# Patient Record
Sex: FEMALE | Race: WHITE
[De-identification: names, ages, dates, MRNs, and addresses within clinical notes are randomized per-mention and may not be internally consistent; named-entity substitution may affect disease eponyms.]

---

## 2017-06-22 ENCOUNTER — HOSPITAL ENCOUNTER (OUTPATIENT)
Dept: HOSPITAL 80 - BRMIMAGING | Age: 52
End: 2017-06-22
Attending: FAMILY MEDICINE
Payer: COMMERCIAL

## 2017-06-22 DIAGNOSIS — R05: Primary | ICD-10-CM

## 2018-01-26 ENCOUNTER — HOSPITAL ENCOUNTER (INPATIENT)
Dept: HOSPITAL 80 - F3N | Age: 53
LOS: 2 days | Discharge: HOME | DRG: 581 | End: 2018-01-28
Attending: SURGERY | Admitting: SURGERY
Payer: COMMERCIAL

## 2018-01-26 DIAGNOSIS — D18.02: ICD-10-CM

## 2018-01-26 DIAGNOSIS — N64.59: Primary | ICD-10-CM

## 2018-01-26 DIAGNOSIS — E03.9: ICD-10-CM

## 2018-01-26 DIAGNOSIS — Z80.3: ICD-10-CM

## 2018-01-26 DIAGNOSIS — Z85.3: ICD-10-CM

## 2018-01-26 PROCEDURE — 0HHV0NZ INSERTION OF TISSUE EXPANDER INTO BILATERAL BREAST, OPEN APPROACH: ICD-10-PCS | Performed by: SURGERY

## 2018-01-26 PROCEDURE — G0378 HOSPITAL OBSERVATION PER HR: HCPCS

## 2018-01-26 PROCEDURE — 07B60ZX EXCISION OF LEFT AXILLARY LYMPHATIC, OPEN APPROACH, DIAGNOSTIC: ICD-10-PCS | Performed by: SURGERY

## 2018-01-26 PROCEDURE — A9520 TC99 TILMANOCEPT DIAG 0.5MCI: HCPCS

## 2018-01-26 PROCEDURE — 0HBV0ZX EXCISION OF BILATERAL BREAST, OPEN APPROACH, DIAGNOSTIC: ICD-10-PCS | Performed by: SURGERY

## 2018-01-26 RX ADMIN — KETOROLAC TROMETHAMINE SCH MG: 15 INJECTION, SOLUTION INTRAMUSCULAR; INTRAVENOUS at 18:34

## 2018-01-26 RX ADMIN — OXYCODONE HYDROCHLORIDE SCH MG: 15 TABLET, FILM COATED, EXTENDED RELEASE ORAL at 21:22

## 2018-01-26 RX ADMIN — CLINDAMYCIN PHOSPHATE SCH MLS: 12 INJECTION, SOLUTION INTRAVENOUS at 21:16

## 2018-01-26 RX ADMIN — KETOROLAC TROMETHAMINE SCH MG: 15 INJECTION, SOLUTION INTRAMUSCULAR; INTRAVENOUS at 23:15

## 2018-01-26 RX ADMIN — Medication PRN MCG: at 16:55

## 2018-01-26 RX ADMIN — ONDANSETRON PRN MG: 2 SOLUTION INTRAMUSCULAR; INTRAVENOUS at 21:27

## 2018-01-26 RX ADMIN — Medication PRN MCG: at 17:22

## 2018-01-26 NOTE — GOP
[f rep st]



                                                                OPERATIVE REPORT





DATE OF OPERATION:  01/26/2018



SURGEON:  PIPPA Doyle Jr., MD



ASSISTANT:  Chao Restrepo CST, by surgeon request (skilled surgical assistant was necessary due t
o the technical complexity of the case and desire to minimize patient anesthesia time).



ANESTHESIA:  The patient had general inhalational anesthetic.



ANESTHESIOLOGIST:  Dr. Daniel Bergeron.



PREOPERATIVE DIAGNOSIS:  Inverted left nipple deformity with history of right breast cancer.



POSTOPERATIVE DIAGNOSIS:  Inverted left nipple deformity with history of right breast cancer.



PROCEDURE PERFORMED:  Immediate bilateral mastectomy reconstruction utilizing tissue expanders and hu
man dermal allograft.



FINDINGS:  





ESTIMATED BLOOD LOSS:  During reconstruction was 10 cc.



DESCRIPTION OF PROCEDURE:  After the risks and benefits of procedure were explained to the patient, h
ighlighting bleeding, infection, implant malposition, pain, numbness, damage to muscles or underlying
 structures, need for premature tissue expander removal, and need for additional procedures, formal o
perative consent was obtained.  She was taken to the operating room by Dr. Avila, where an uncomplicate
d bilateral mastectomy was performed.  



The reconstruction began by irrigating both pockets copiously with normal saline.  Meticulous hemosta
sis was assured.  The pockets were rinsed with double antibiotic saline.  Submuscular pocket was crea
jeff for the tissue expander through the inferior medial origin of the pectoralis major muscle.  A sub
muscular pocket was completely dissected beneath the serratus anterior and pectoralis major muscle.  
An Aeroform medium 600 cc tissue expander was soaked in triple antibiotic saline, tested with its shirley
l remote control, and then placed into the correct position on the chest wall bilaterally.  It was ovalles
tured down to the chest wall using 2-0 PDS suture.  



Medium contour AlloDerm acellular dermis graft sheets were triple rinsed in normal saline and soaked 
in triple antibiotic saline.  They were then used to reconstruct the inferior pole of both breasts.  
An additional 10 cc of air was placed into the initial 190 cc tissue expander, to assure correct franco
ng and functioning of the filled device prior to closing the mastectomy flaps.  The mastectomy flaps 
were warm and pink with good capillary refill.  Small dog ears were excised medially to assure smooth
 contour.  15-round BLAYNE drains were placed.  The pockets were rinsed a final time with double antibiot
ic saline.  15 cc of 0.25% plain Marcaine was instilled in the each pocket.  The mastectomy flaps wer
e then closed in 2 layers.  Bacitracin, Xeroform, 4 x 4's, and compressive bra were placed in the ope
rating room.  She was extubated in the OR, taken to recovery room, awake and in stable condition.



TISSUE EXPANDERS:  Aeroform 600 cc medium devices bilaterally, filled to 210 cc.



DRAINS:  Two BLAYNE drains were placed.



COMPLICATIONS:  No complications.



INDICATIONS FOR OPERATION:  The patient is a 52-year-old, white female, who underwent a previous righ
t lumpectomy for breast cancer and presented with a significant inverted left nipple deformity.  She 
elected to undergo a completion right-sided mastectomy with prophylactic left breast mastectomy, and 
was referred by Dr. Maykel Avila for consideration for reconstruction.  She was noted to have a severe
 asymmetry between the 2 breasts prior to surgery from her previous procedure, but was deemed a good 
candidate to have bilateral skin-sparing mastectomies with tissue expander placement, and was taken t
o the operating room for that purpose.





Job #:  [id number]

## 2018-01-26 NOTE — POSTOPPROG
Post Op Note


Date of Operation: 01/26/18


Surgeon: Maykel Avila


Anesthesiologist: Daniel Bergeron


Anesthesia: GET(General Endotracheal)


Pre-op Diagnosis: Personal history of breast cancer


Post-op Diagnosis: Same


Procedure: Bilat simple mastectomy with left ax SLNB, bilat TE reconstruction


Inf/Abcess present in the surg proc area at time of surgery?: No


EBL: Minimal


Complications: 





no immediate


Drains: Costa Babcock


Specimen(s): 





bilat breasts, left ax nodes

## 2018-01-26 NOTE — PDANEPAE
ANE History of Present Illness





mastectomy and reconstruction





ANE Past Medical History





- Cardiovascular History


Hx Hypertension: No


Hx Arrhythmias: No


Hx Chest Pain: No


Hx Coronary Artery / Peripheral Vascular Disease: No


Hx CHF / Valvular Disease: No


Hx Palpitations: No





- Pulmonary History


Hx COPD: No


Hx Asthma/Reactive Airway Disease: No


Hx Recent Upper Respiratory Infection: No


Hx Oxygen in Use at Home: No


Hx Sleep Apnea: No


Sleep Apnea Screening Result - Last Documented: Negative





- Neurologic History


Hx Cerebrovascular Accident: No


Hx Seizures: No


Hx Dementia: No





- Endocrine History


Hx Diabetes: No


Endocrine History Comment: HYPO THYROID





- Renal History


Hx Renal Disorders: No





- Liver History


Hx Hepatic Disorders: No





- Neurological & Psychiatric Hx


Hx Neurological and Psychiatric Disorders: No


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 

BRAIN STEM BENIGN





- Cancer History


Hx Cancer: Yes


Cancer History Comment: BREAST CANCER





- Congenital Disorder History


Hx Congenital Disorders: No





- GI History


Hx Gastrointestinal Disorders: No





- Other Health History


Other Health History: NA





- Surgical History


Prior Surgeries: R ELBOW SURGERY 2008.  R LUMPECTOMY 2009.  POWER PORT PLACEMNT 

2009.  PORT REMOVAL 2012





ANE Review of Systems


Review of Systems: 








- Exercise capacity


METS (RN): 5 METS





ANE Patient History





- Allergies


Allergies/Adverse Reactions: 








cephalexin Allergy (Severe, Verified 12/29/17 15:35)


 Other-Enter Comments


Iodinated Contrast- Oral and IV Dye Allergy (Severe, Verified 12/29/17 15:35)


 Hives








- Home Medications


Home medications: home medication list seen and reviewed


Home Medications: 








Dronabinol [Marinol 2.5 MG (*)] 2.5 mg PO HS PRN 12/29/17 [Last Taken 01/25/18]


Levothyroxine [Synthroid 88 mcg (*)] 88 mcg PO DAILY06 12/29/17 [Last Taken 01/ 26/18]


celeCOXIB [CeleBREX] 100 mg PO BID 12/29/17 [Last Taken 01/12/18]


oxyCODONE CR [Oxycontin] 15 mg PO BID 12/29/17 [Last Taken 01/25/18]


oxyCODONE/APAP 5/325 [Percocet 5/325 (*)] 1 tab PO QID 12/29/17 [Last Taken 01/ 25/18]








- NPO status


NPO Status: no food or drink >8 hours


NPO Since - Liquids (Date): 01/26/18


NPO Since - Liquids (Time): 07:30


NPO Since - Solids (Date): 01/25/18


NPO Since - Solids (Time): 20:00





- Anes Hx


Anes Hx: no prior problems





- Smoking Hx


Smoking Status: Never smoked





- Alcohol Use


Alcohol Use: None





- Family Anes Hx


Family Hx Anesthesia Complications: NA





ANE Labs/Vital Signs





- Vital Signs


Blood Pressure: 149/86


Heart Rate: 69


Respiratory Rate: 14


O2 Sat (%): 97


Height: 170.18 cm


Weight: 87.543 kg





ANE Physical Exam





- Airway


Neck exam: FROM


Mallampati Score: Class 1


Mouth exam: normal dental/mouth exam





- Pulmonary


Pulmonary: no respiratory distress





- Cardiovascular


Cardiovascular: regular rate and rhythym





- ASA Status


ASA Status: II





ANE Anesthesia Plan


Anesthesia Plan: GA w LMA

## 2018-01-27 RX ADMIN — LEVOTHYROXINE SODIUM SCH MCG: 0.09 TABLET ORAL at 05:42

## 2018-01-27 RX ADMIN — SODIUM CHLORIDE, SODIUM LACTATE, POTASSIUM CHLORIDE, AND CALCIUM CHLORIDE SCH MLS: 600; 310; 30; 20 INJECTION, SOLUTION INTRAVENOUS at 13:14

## 2018-01-27 RX ADMIN — ONDANSETRON PRN MG: 2 SOLUTION INTRAMUSCULAR; INTRAVENOUS at 03:03

## 2018-01-27 RX ADMIN — KETOROLAC TROMETHAMINE SCH MG: 15 INJECTION, SOLUTION INTRAMUSCULAR; INTRAVENOUS at 13:04

## 2018-01-27 RX ADMIN — HYDROCODONE BITARTRATE AND ACETAMINOPHEN PRN TAB: 5; 325 TABLET ORAL at 08:38

## 2018-01-27 RX ADMIN — ONDANSETRON PRN MG: 2 SOLUTION INTRAMUSCULAR; INTRAVENOUS at 08:35

## 2018-01-27 RX ADMIN — SODIUM CHLORIDE, SODIUM LACTATE, POTASSIUM CHLORIDE, AND CALCIUM CHLORIDE SCH MLS: 600; 310; 30; 20 INJECTION, SOLUTION INTRAVENOUS at 03:04

## 2018-01-27 RX ADMIN — CLINDAMYCIN PHOSPHATE SCH MLS: 12 INJECTION, SOLUTION INTRAVENOUS at 15:19

## 2018-01-27 RX ADMIN — KETOROLAC TROMETHAMINE SCH MG: 15 INJECTION, SOLUTION INTRAMUSCULAR; INTRAVENOUS at 18:27

## 2018-01-27 RX ADMIN — OXYCODONE HYDROCHLORIDE AND ACETAMINOPHEN SCH TAB: 5; 325 TABLET ORAL at 20:53

## 2018-01-27 RX ADMIN — OXYCODONE HYDROCHLORIDE SCH MG: 15 TABLET, FILM COATED, EXTENDED RELEASE ORAL at 10:04

## 2018-01-27 RX ADMIN — KETOROLAC TROMETHAMINE SCH MG: 15 INJECTION, SOLUTION INTRAMUSCULAR; INTRAVENOUS at 05:41

## 2018-01-27 RX ADMIN — OXYCODONE HYDROCHLORIDE AND ACETAMINOPHEN SCH TAB: 5; 325 TABLET ORAL at 13:03

## 2018-01-27 RX ADMIN — HYDROCODONE BITARTRATE AND ACETAMINOPHEN PRN TAB: 5; 325 TABLET ORAL at 03:03

## 2018-01-27 RX ADMIN — CLINDAMYCIN PHOSPHATE SCH MLS: 12 INJECTION, SOLUTION INTRAVENOUS at 05:42

## 2018-01-27 RX ADMIN — OXYCODONE HYDROCHLORIDE SCH MG: 15 TABLET, FILM COATED, EXTENDED RELEASE ORAL at 20:53

## 2018-01-27 RX ADMIN — OXYCODONE HYDROCHLORIDE AND ACETAMINOPHEN SCH TAB: 5; 325 TABLET ORAL at 15:18

## 2018-01-27 NOTE — GOP
[f 
rep st]



                                                                OPERATIVE REPORT





DATE OF OPERATION:  01/26/2018



SURGEON:  Maykel Avila MD



ASSISTANT:  Chao Restrepo CST.



ANESTHESIA:  General.



ANESTHESIOLOGIST:  Dr. Bergeron.  



PLASTIC SURGEON:  PIPPA Doyle MD.



PREOPERATIVE DIAGNOSIS:  

1.  Personal history of right breast carcinoma.

2.  Left nipple inversion.



POSTOPERATIVE DIAGNOSIS:  

1.  Personal history of right breast carcinoma.

2.  Left nipple inversion.



PROCEDURE PERFORMED:  

1.  Bilateral simple mastectomy with left axillary sentinel node biopsy.

2.  Immediate tissue expander reconstruction with AlloDerm.



FINDINGS:  See below. 



INDICATIONS:  52-year-old female with a history of right breast carcinoma.  She 
underwent a prior lumpectomy with radiation therapy.  She has developed new 
left nipple inversion.  Imaging studies show no definite evidence of 
malignancy.  She has opted to undergo bilateral mastectomies at this time with 
left axillary sentinel node sampling.  Surgical risks and benefits explained 
were bleeding, infection, differential diagnosis, skin flap necrosis, future 
breast cancer development, arm edema, nerve injury, as well as others.  All 
questions were entertained.  She desires to proceed.  A surgical assistant is 
standard, necessary and customary for the safe performance of this procedure. 



DESCRIPTION OF PROCEDURE:  Upon returning from left breast lymphoscintigraphy, 
general anesthesia was induced.  Bilateral breasts were elliptically incised, 
incorporating the nipple-areolar complexes.  Using electrocautery, skin flaps 
were created to the level of the clavicles, sternum, inframammary fold, as well 
as latissimus dorsi muscle laterally.  The breast envelopes were peeled from 
medial to lateral and taken high up into the axillary tails of [facility].  Both 
specimens were tagged for orientation and sent for permanent processing.  The 
left axilla was opened.  A collection of at least 3 lymph nodes were identified
, measuring 5500 units, 3000 units, and 1500 units on the gamma counter.  
Background activity was all less than 250 units.  No clinically suspicious 
adenopathy was present.  Satisfactory hemostasis was assured throughout 
bilateral breast cavities, as well as the left axilla.  Skin envelopes appeared 
all pink and healthy.  Care of the case was turned to Dr. Doyle for tissue 
expander placement and wound closure.



Copy requested to:

Dr. Herbert Salinas



Job #:  [id number]

MTDD

## 2018-01-27 NOTE — SOAPPROG
SOAP Progress Note


Assessment/Plan: 


Assessment:nausea and lightheadedness overnight.  pain reasonably controlled, R>

L.  avss.  comfortable.  chest flaps pink and viable.  mac serosang. s/p bilat 

mast.  doing well overall.  not ready for discharge - cont IVF until able to 

ambulate independently.  diet when able.  cont supportive care. cont home 

analgesics (cavernous hemangioma).  anticipate dc tomorrow. 


























Plan:





01/27/18 11:10





01/27/18 11:12


a


Objective: 





 Vital Signs











Temp Pulse Resp BP Pulse Ox


 


 36.5 C   71   14   101/60   94 


 


 01/27/18 08:00  01/27/18 08:00  01/27/18 08:00  01/27/18 08:00  01/27/18 08:00








 











 01/26/18 01/27/18 01/28/18





 05:59 05:59 05:59


 


Intake Total  3705 


 


Output Total  910 415


 


Balance  2795 -415














ICD10 Worksheet


Patient Problems: 


 Problems











Problem Status Onset


 


Personal history of breast cancer Acute  














- ICD10 Problem Qualifiers


(1) Personal history of breast cancer

## 2018-01-28 VITALS
OXYGEN SATURATION: 91 % | SYSTOLIC BLOOD PRESSURE: 114 MMHG | TEMPERATURE: 98.1 F | HEART RATE: 75 BPM | DIASTOLIC BLOOD PRESSURE: 82 MMHG | RESPIRATION RATE: 18 BRPM

## 2018-01-28 RX ADMIN — KETOROLAC TROMETHAMINE SCH MG: 15 INJECTION, SOLUTION INTRAMUSCULAR; INTRAVENOUS at 05:37

## 2018-01-28 RX ADMIN — OXYCODONE HYDROCHLORIDE SCH MG: 15 TABLET, FILM COATED, EXTENDED RELEASE ORAL at 10:23

## 2018-01-28 RX ADMIN — SODIUM CHLORIDE, SODIUM LACTATE, POTASSIUM CHLORIDE, AND CALCIUM CHLORIDE SCH MLS: 600; 310; 30; 20 INJECTION, SOLUTION INTRAVENOUS at 05:42

## 2018-01-28 RX ADMIN — LEVOTHYROXINE SODIUM SCH MCG: 0.09 TABLET ORAL at 05:40

## 2018-01-28 RX ADMIN — KETOROLAC TROMETHAMINE SCH MG: 15 INJECTION, SOLUTION INTRAMUSCULAR; INTRAVENOUS at 00:34

## 2018-01-28 RX ADMIN — OXYCODONE HYDROCHLORIDE AND ACETAMINOPHEN SCH TAB: 5; 325 TABLET ORAL at 05:41

## 2018-01-28 NOTE — SOAPPROG
SOAP Progress Note


Assessment/Plan: 


Assessment:significantly improved overnight.  less nausea - well controlled 

with zofran ODT. no further lightheadedness.  able to ambulate independently 

today.  avss.  comfortable.  skin color better.  flaps pink.  MAC thin/serosang.

  pod#2 s/p bilat mastectomy.   improved.  home today.  rx zofran.  f/u dr. drew next week as scheduled.  path pending.  f/u meme vergara 2 weeks.  full dc 

instructions explained. 





nausea and lightheadedness overnight.  pain reasonably controlled, R>L.  avss.  

comfortable.  chest flaps pink and viable.  mac serosang. s/p bilat mast.  doing 

well overall.  not ready for discharge - cont IVF until able to ambulate 

independently.  diet when able.  cont supportive care. cont home analgesics (

cavernous hemangioma).  anticipate dc tomorrow. 


























Plan:





01/27/18 11:10





01/27/18 11:12


a


01/28/18 09:34





Objective: 





 Vital Signs











Temp Pulse Resp BP Pulse Ox


 


 36.7 C   75   18   114/82 H  91 L


 


 01/28/18 08:00  01/28/18 08:00  01/28/18 08:00  01/28/18 08:00  01/28/18 08:00








 











 01/27/18 01/28/18 01/29/18





 05:59 05:59 05:59


 


Intake Total 9555 2885 


 


Output Total 357 2446 


 


Balance 3784 -107 














ICD10 Worksheet


Patient Problems: 


 Problems











Problem Status Onset


 


Personal history of breast cancer Acute  














- ICD10 Problem Qualifiers


(1) Personal history of breast cancer

## 2018-01-28 NOTE — GDS
[f 
rep st]



                                                             DISCHARGE SUMMARY





REASON FOR ADMISSION:  History of breast cancer.



HOSPITAL COURSE:  52-year-old female with a history of right breast carcinoma.  
She underwent a bilateral prophylactic mastectomy with left axillary sentinel 
node sampling with immediate tissue expander reconstruction.  She had a benign 
postoperative course, lengthened only by notable postop nausea and 
lightheadedness her first day.  This resolved with supportive care measures.  
She was able to be discharged home on her regular medicine regimen.  She had 
oxycodone for breakthrough discomfort.  She was given a prescription for Zofran 
as needed for nausea.  She will be seen by Dr. Doyle as scheduled on the 
31st.  She will be seen by Dr. Avila in 2 weeks.  Full discharge instructions 
were explained prior to leaving.



Copy requested to:

Riri Ochoa



Job #:  [id number]

MTDD

## 2018-01-28 NOTE — ASDISCHSUM
----------------------------------------------

Discharge Information

----------------------------------------------

Plan Status:                                         Medically Cleared to Leave:

Discharge Date:01/28/2018 11:21 AM                   CM D/C Disposition:

ADT D/C Disposition:Home, Routine, Self-Care         Projected Discharge Date:01/28/2018 11:21 AM

Transportation at D/C:                               Discharge Delay Reason:

Follow-Up Date:01/28/2018 11:21 AM                   Discharge Slot:

Final Diagnosis:

----------------------------------------------

Placement Information

----------------------------------------------

----------------------------------------------

Patient Contact Information

----------------------------------------------

Contact Name:AHSOK                          Relationship:Antonio

Address:                                             Home [phone]

                                                     Work Phone:

City:                                                [facility] Phone:

State/Zip Code:                                      Email:

----------------------------------------------

Financial Information

----------------------------------------------

Financial Class:HMO and PPO Plans

Primary Plan Desc:[employer] OUT OF STATE PPO                Primary Plan Number:[id number]

Secondary Plan Desc:                                 Secondary Plan Number:

 

 

----------------------------------------------

Assessment Information

----------------------------------------------

----------------------------------------------

Intervention Information

----------------------------------------------

Intervention Type:*Incorrect Registration            Date of Service:01/26/2018 03:08 PM

Patient Type:Inpatient                               Staff Member:LOIS Teague, Lucia

Hours:                                               Discipline:

Severity:                                            Comment:

## 2018-02-01 NOTE — PQFORM
PHYSICIAN QUERY FORM

 

 Needs Your Response





This query form is being sent to you to assure this patient record is coded 
properly.  Please respond to the question below:



 QUESTION:



Dr. Avila,



The diagnosis of DUCTAL CARCINOMA IN SITU LEFT BREAST is documented in the 
Pathology Report dated 01/29/2018. Would this be appropriate as an additional 
diagnosis on the discharge summary?



Yes_________________________________

No __________________________________

Other No - it wasn't diagnosed until after discharge.  

Clinically Undetermined _________________



Many thanks, 



CHATO Mendoza

HIM/Coding Department



INSTRUCTIONS FOR RESPONSE:



Answer question by clicking on the "Edit Document" button.

Move cursor to area below the stars.

When complete, hit "Save."

Click on the "Sign" button, then click "Sign" again.

Type in your PIN and hit "Enter."



****************

MTDD